# Patient Record
Sex: FEMALE
[De-identification: names, ages, dates, MRNs, and addresses within clinical notes are randomized per-mention and may not be internally consistent; named-entity substitution may affect disease eponyms.]

---

## 2024-05-20 ENCOUNTER — P2P PATIENT RECORD (OUTPATIENT)
Age: 29
End: 2024-05-20

## 2024-06-21 ENCOUNTER — OFFICE VISIT (OUTPATIENT)
Dept: URBAN - NONMETROPOLITAN AREA CLINIC 4 | Facility: CLINIC | Age: 29
End: 2024-06-21
Payer: COMMERCIAL

## 2024-06-21 ENCOUNTER — DASHBOARD ENCOUNTERS (OUTPATIENT)
Age: 29
End: 2024-06-21

## 2024-06-21 ENCOUNTER — LAB OUTSIDE AN ENCOUNTER (OUTPATIENT)
Dept: URBAN - NONMETROPOLITAN AREA CLINIC 4 | Facility: CLINIC | Age: 29
End: 2024-06-21

## 2024-06-21 VITALS
HEART RATE: 78 BPM | DIASTOLIC BLOOD PRESSURE: 73 MMHG | HEIGHT: 70 IN | SYSTOLIC BLOOD PRESSURE: 120 MMHG | WEIGHT: 145.8 LBS | BODY MASS INDEX: 20.87 KG/M2 | TEMPERATURE: 98.5 F

## 2024-06-21 DIAGNOSIS — K59.09 CHANGE IN BOWEL MOVEMENTS INTERMITTENT CONSTIPATION. URGENCY IN THE MORNING.: ICD-10-CM

## 2024-06-21 DIAGNOSIS — R14.0 POSTPRANDIAL ABDOMINAL BLOATING: ICD-10-CM

## 2024-06-21 DIAGNOSIS — K92.1 BLOOD IN STOOL: ICD-10-CM

## 2024-06-21 DIAGNOSIS — D50.8 ACHLORHYDRIC ANEMIA: ICD-10-CM

## 2024-06-21 PROBLEM — 87522002: Status: ACTIVE | Noted: 2024-06-21

## 2024-06-21 PROBLEM — 14760008: Status: ACTIVE | Noted: 2024-06-21

## 2024-06-21 PROCEDURE — 99204 OFFICE O/P NEW MOD 45 MIN: CPT | Performed by: REGISTERED NURSE

## 2024-06-21 RX ORDER — FERROUS SULFATE 325(65) MG
1 TABLET TABLET ORAL
Status: ACTIVE | COMMUNITY

## 2024-06-21 RX ORDER — DOCUSATE SODIUM 100 MG/1
1 CAPSULE AS NEEDED CAPSULE, LIQUID FILLED ORAL TWICE A DAY
Status: ACTIVE | COMMUNITY

## 2024-06-21 RX ORDER — SODIUM, POTASSIUM,MAG SULFATES 17.5-3.13G
177ML SOLUTION, RECONSTITUTED, ORAL ORAL
Qty: 354 ML | Refills: 0 | OUTPATIENT
Start: 2024-06-21 | End: 2024-06-23

## 2024-06-21 NOTE — HPI-TODAY'S VISIT:
6/21/24: Pt is a 27 yo female with PMH of anemia who was referred by Dr. Marlene Medina for evaluation of blood in stool and postprandial bloating.  A copy of this document will be sent to the referring physician.   Pt reports constipation, postprandial bloating and 2 episodes of blood on her stools back in May. States she used to play basketball and when she stopped playing, she wouldn't eat as much or use the bathroom as often which caused constipation and the bloating. Denies any N/V, heartburn, reflux, weight loss. Denies heavy or irregular menses. She was seen by her OBGYN in May who prescribed Colace twice daily. Since then, she reports one episode of blood on stool, but none further and denies any further bloating or constipaiton. Per labs 5/20/24, Hgb 7.1, Hcg 27.5, MCV 60.6, MCH 15.6. LFTs and Tbili wnl. She reports a history of anemia. Has never had workup. She started taking FeroSul 325 mg twice daily. No FHx of GI malignancy.

## 2024-06-22 LAB
FERRITIN, SERUM: 14
HEMATOCRIT: 41.3
HEMOGLOBIN: 11.1
IRON BIND.CAP.(TIBC): 470
IRON SATURATION: 77
IRON: 360
MCH: 18.7
MCHC: 26.9
MCV: 69.5
MPV: (no result)
PLATELET COUNT: 291
RDW: 28
RED BLOOD CELL COUNT: 5.94
WHITE BLOOD CELL COUNT: 6.3

## 2024-07-11 ENCOUNTER — OFFICE VISIT (OUTPATIENT)
Dept: URBAN - NONMETROPOLITAN AREA CLINIC 4 | Facility: CLINIC | Age: 29
End: 2024-07-11

## 2024-08-23 ENCOUNTER — OFFICE VISIT (OUTPATIENT)
Dept: URBAN - METROPOLITAN AREA SURGERY CENTER 14 | Facility: SURGERY CENTER | Age: 29
End: 2024-08-23

## 2024-09-16 ENCOUNTER — OFFICE VISIT (OUTPATIENT)
Dept: URBAN - NONMETROPOLITAN AREA CLINIC 4 | Facility: CLINIC | Age: 29
End: 2024-09-16

## 2024-10-02 ENCOUNTER — OFFICE VISIT (OUTPATIENT)
Dept: URBAN - METROPOLITAN AREA SURGERY CENTER 14 | Facility: SURGERY CENTER | Age: 29
End: 2024-10-02